# Patient Record
Sex: MALE | Race: WHITE | Employment: PART TIME | URBAN - NONMETROPOLITAN AREA
[De-identification: names, ages, dates, MRNs, and addresses within clinical notes are randomized per-mention and may not be internally consistent; named-entity substitution may affect disease eponyms.]

---

## 2024-07-01 ENCOUNTER — HOSPITAL ENCOUNTER (EMERGENCY)
Age: 19
Discharge: HOME | End: 2024-07-01
Payer: COMMERCIAL

## 2024-07-01 VITALS
WEIGHT: 200 LBS | RESPIRATION RATE: 18 BRPM | OXYGEN SATURATION: 99 % | DIASTOLIC BLOOD PRESSURE: 80 MMHG | HEART RATE: 76 BPM | SYSTOLIC BLOOD PRESSURE: 128 MMHG | TEMPERATURE: 98.5 F

## 2024-07-01 DIAGNOSIS — G43.811 OTHER MIGRAINE WITH STATUS MIGRAINOSUS, INTRACTABLE: Primary | ICD-10-CM

## 2024-07-01 DIAGNOSIS — R11.2 NAUSEA AND VOMITING, UNSPECIFIED VOMITING TYPE: ICD-10-CM

## 2024-07-01 PROCEDURE — 99213 OFFICE O/P EST LOW 20 MIN: CPT

## 2024-07-01 RX ORDER — CETIRIZINE HYDROCHLORIDE 10 MG/1
10 TABLET ORAL DAILY
COMMUNITY

## 2024-07-01 RX ORDER — ONDANSETRON 4 MG/1
4 TABLET, ORALLY DISINTEGRATING ORAL 3 TIMES DAILY PRN
Qty: 21 TABLET | Refills: 0 | Status: SHIPPED | OUTPATIENT
Start: 2024-07-01

## 2024-07-01 ASSESSMENT — PAIN DESCRIPTION - LOCATION: LOCATION: HEAD

## 2024-07-01 ASSESSMENT — ENCOUNTER SYMPTOMS
SORE THROAT: 0
DIARRHEA: 0
COUGH: 0
EYE DISCHARGE: 0
SHORTNESS OF BREATH: 0
TROUBLE SWALLOWING: 0
NAUSEA: 1
RHINORRHEA: 0
EYE REDNESS: 0
VOMITING: 1

## 2024-07-01 ASSESSMENT — PAIN DESCRIPTION - PAIN TYPE: TYPE: ACUTE PAIN

## 2024-07-01 ASSESSMENT — PAIN SCALES - GENERAL: PAINLEVEL_OUTOF10: 6

## 2024-07-01 ASSESSMENT — PAIN - FUNCTIONAL ASSESSMENT: PAIN_FUNCTIONAL_ASSESSMENT: 0-10

## 2024-07-01 NOTE — ED NOTES
States he woke up with a headache wrapping around his forehead. He went to work, but said the headache was too painful. He took ibuprofen around 1030 this morning. Reports he has vomited twice since. Rates pain a 6/10.     Mary Grayson, RN  07/01/24 5822

## 2024-07-01 NOTE — ED PROVIDER NOTES
[facility] [place] [facility]RGENT CARE  Urgent Care Encounter      CHIEF COMPLAINT       Chief Complaint   Patient presents with    Headache    Emesis       Nurses Notes reviewed and I agree except as noted in the HPI.  HISTORY OF PRESENT ILLNESS   Javid Wynn is a 19 y.o. male who presents urgent care for evaluation of headache and vomiting.  Patient reports onset of symptoms this morning.  Reports that he woke up with count of like a slight headache.  He reports he went to work and as the day progressed he developed more specific pain to his right upper side.  He reports the headache seems to wax and wane a bit.  Denies any change in position.  Reports that about 1015 he started to feel little nauseated like he was going to vomit.  Denies any fevers.  Reports he took ibuprofen at this time and it did decrease the severity of the headache.  He reports headache prior to ibuprofen was 9 out of 10.  Level currently is 5-6 out of 10.  Mentions he has had migraines in the past none recently.  Denies any fevers, fatigue, nasal congestion sore throat cough.  Denies any light sensitivity or noise sensitivity.    REVIEW OF SYSTEMS     Review of Systems   Constitutional:  Negative for chills, diaphoresis, fatigue and fever.   HENT:  Negative for congestion, ear pain, rhinorrhea, sore throat and trouble swallowing.    Eyes:  Negative for discharge and redness.   Respiratory:  Negative for cough and shortness of breath.    Cardiovascular:  Negative for chest pain.   Gastrointestinal:  Positive for nausea and vomiting. Negative for diarrhea.   Genitourinary:  Negative for decreased urine volume.   Musculoskeletal:  Negative for neck pain and neck stiffness.   Skin:  Negative for rash.   Neurological:  Positive for headaches.   Hematological:  Negative for adenopathy.   Psychiatric/Behavioral:  Negative for sleep disturbance.        PAST MEDICAL HISTORY   History reviewed. No pertinent past medical history.    SURGICAL HISTORY

## 2024-07-01 NOTE — DISCHARGE INSTRUCTIONS
Prescribed Excedrin Migraine 2 tablets every 6 hours as needed for migraine.  Do not take more than 8 tablets in a 24-hour period.  Prescribed Zofran 3 times daily as needed for nausea and vomiting related to migraine.  Recommend rest in a cool, dark, quiet place.  Follow-up primary care provider or [facility] in 3 to 5 days if symptoms worsen or fail to improve.

## 2024-11-18 ENCOUNTER — HOSPITAL ENCOUNTER (EMERGENCY)
Age: 19
Discharge: HOME | End: 2024-11-18
Payer: COMMERCIAL

## 2024-11-18 VITALS
OXYGEN SATURATION: 100 % | DIASTOLIC BLOOD PRESSURE: 66 MMHG | RESPIRATION RATE: 16 BRPM | TEMPERATURE: 99.4 F | HEART RATE: 114 BPM | SYSTOLIC BLOOD PRESSURE: 122 MMHG

## 2024-11-18 DIAGNOSIS — K52.9 GASTROENTERITIS: Primary | ICD-10-CM

## 2024-11-18 PROCEDURE — 99213 OFFICE O/P EST LOW 20 MIN: CPT | Performed by: NURSE PRACTITIONER

## 2024-11-18 PROCEDURE — 99213 OFFICE O/P EST LOW 20 MIN: CPT

## 2024-11-18 RX ORDER — ONDANSETRON 4 MG/1
4 TABLET, ORALLY DISINTEGRATING ORAL 3 TIMES DAILY PRN
Qty: 21 TABLET | Refills: 0 | Status: SHIPPED | OUTPATIENT
Start: 2024-11-18

## 2024-11-18 ASSESSMENT — ENCOUNTER SYMPTOMS
ABDOMINAL PAIN: 0
SHORTNESS OF BREATH: 0
STRIDOR: 0
NAUSEA: 1
WHEEZING: 0
SORE THROAT: 0
DIARRHEA: 0
COUGH: 0
CHEST TIGHTNESS: 0
APNEA: 0
CHOKING: 0

## 2024-11-18 NOTE — ED PROVIDER NOTES
COURSE:     Vitals:    11/18/24 1008   BP: 122/66   Pulse: (!) 114   Resp: 16   Temp: 99.4 °F (37.4 °C)   SpO2: 100%       Medications - No data to display  PROCEDURES:  None  FINAL IMPRESSION      1. Gastroenteritis        DISPOSITION/PLAN   Decision To Discharge     These symptoms are consistent with viral gastroenteritis. I recommend Clear Liquids only next 12-24 hours.If tolerated then BRAT Diet .  Advise the patient that if worsening symptoms such as more than 6 stools per day, not voiding regularly, persistent vomiting not relieved with medication,unable to take oral fluids, high fever, severe weakness, lightheadedness or fainting, dry mucous membranes or other signs of dehydration, persisting or increasing abdominal pain, blood in stool or vomit, or failure to improve in 1-2 days.   The patient needs to be reevaluated at that time by their primary care provider for a recheck, OR go the Emergency Department for reevaluation.   The patient or patient's representative are agreeable to the treatment plan and left ambulatory without any complaints at this time.         REFERRED TO:   [facility]  [address] [place] [id number] [phone]  Schedule an appointment as soon as possible for a visit today      DISCHARGE MEDICATIONS:  Discharge Medication List as of 11/18/2024 10:30 AM        Discharge Medication List as of 11/18/2024 10:30 AM        CONTINUE these medications which have CHANGED    Details   ondansetron (ZOFRAN-ODT) 4 MG disintegrating tablet Take 1 tablet by mouth 3 times daily as needed for Nausea or Vomiting, Disp-21 tablet, R-0Normal      aspirin-acetaminophen-caffeine (EXCEDRIN MIGRAINE) 250-250-65 MG per tablet Take 2 tablets by mouth every 6 hours as needed for Headaches, Disp-90 tablet, R-0Normal             ZOFIA Mc CNP, Tawnya Rae, APRN - CNP  11/18/24 1120

## 2024-11-18 NOTE — ED NOTES
To [facility] with complaints of dry throat that started yesterday. Today woke up feeling worse, sore throat and vomited x 3 today. Declines strep test. Needs work and school note     Suyapa Brooks, JACLYN  11/18/24 2912